# Patient Record
Sex: MALE | Race: WHITE | NOT HISPANIC OR LATINO | Employment: FULL TIME | URBAN - METROPOLITAN AREA
[De-identification: names, ages, dates, MRNs, and addresses within clinical notes are randomized per-mention and may not be internally consistent; named-entity substitution may affect disease eponyms.]

---

## 2023-09-13 PROBLEM — Z98.890: Status: RESOLVED | Noted: 2023-09-13 | Resolved: 2023-09-13

## 2023-09-13 PROBLEM — R05.9 COUGH: Status: RESOLVED | Noted: 2023-09-13 | Resolved: 2023-09-13

## 2023-09-13 PROBLEM — S06.0XAA CONCUSSION: Status: RESOLVED | Noted: 2023-09-13 | Resolved: 2023-09-13

## 2023-09-13 PROBLEM — R53.83 FATIGUE: Status: RESOLVED | Noted: 2023-09-13 | Resolved: 2023-09-13

## 2023-09-13 PROBLEM — R79.89 LOW VITAMIN D LEVEL: Status: ACTIVE | Noted: 2023-09-13

## 2023-09-13 PROBLEM — G44.86 HEADACHE, CERVICOGENIC: Status: RESOLVED | Noted: 2023-09-13 | Resolved: 2023-09-13

## 2023-09-13 PROBLEM — S89.91XA INJURY OF KNEE, RIGHT: Status: RESOLVED | Noted: 2023-09-13 | Resolved: 2023-09-13

## 2023-09-13 PROBLEM — F32.A DEPRESSION: Status: ACTIVE | Noted: 2023-09-13

## 2023-09-13 PROBLEM — J20.9 ACUTE BRONCHITIS: Status: RESOLVED | Noted: 2023-09-13 | Resolved: 2023-09-13

## 2023-09-13 PROBLEM — M25.561 RIGHT KNEE PAIN: Status: RESOLVED | Noted: 2023-09-13 | Resolved: 2023-09-13

## 2023-09-13 PROBLEM — F07.81 POST CONCUSSIVE SYNDROME: Status: RESOLVED | Noted: 2023-09-13 | Resolved: 2023-09-13

## 2023-09-13 PROBLEM — G43.809 VESTIBULAR MIGRAINE: Status: RESOLVED | Noted: 2023-09-13 | Resolved: 2023-09-13

## 2023-09-13 PROBLEM — R07.89 CHEST PRESSURE: Status: RESOLVED | Noted: 2023-09-13 | Resolved: 2023-09-13

## 2023-09-13 PROBLEM — S05.91XA OCULAR TRAUMA OF RIGHT EYE: Status: ACTIVE | Noted: 2023-09-13

## 2023-09-13 PROBLEM — R42 DIZZINESS: Status: RESOLVED | Noted: 2023-09-13 | Resolved: 2023-09-13

## 2023-09-13 PROBLEM — F41.9 ANXIETY: Status: ACTIVE | Noted: 2023-09-13

## 2023-09-13 PROBLEM — S83.241A TEAR OF MEDIAL MENISCUS OF RIGHT KNEE: Status: RESOLVED | Noted: 2023-09-13 | Resolved: 2023-09-13

## 2023-09-13 PROBLEM — R09.81 NASAL CONGESTION: Status: RESOLVED | Noted: 2023-09-13 | Resolved: 2023-09-13

## 2023-09-14 ENCOUNTER — OFFICE VISIT (OUTPATIENT)
Dept: PRIMARY CARE | Facility: CLINIC | Age: 38
End: 2023-09-14
Payer: COMMERCIAL

## 2023-09-14 VITALS — BODY MASS INDEX: 21.98 KG/M2 | WEIGHT: 157 LBS | HEIGHT: 71 IN

## 2023-09-14 DIAGNOSIS — J01.00 ACUTE NON-RECURRENT MAXILLARY SINUSITIS: Primary | ICD-10-CM

## 2023-09-14 PROCEDURE — 99214 OFFICE O/P EST MOD 30 MIN: CPT | Performed by: FAMILY MEDICINE

## 2023-09-14 PROCEDURE — 87635 SARS-COV-2 COVID-19 AMP PRB: CPT

## 2023-09-14 PROCEDURE — 1036F TOBACCO NON-USER: CPT | Performed by: FAMILY MEDICINE

## 2023-09-14 RX ORDER — AMOXICILLIN AND CLAVULANATE POTASSIUM 875; 125 MG/1; MG/1
875 TABLET, FILM COATED ORAL 2 TIMES DAILY
Qty: 14 TABLET | Refills: 0 | Status: SHIPPED | OUTPATIENT
Start: 2023-09-14 | End: 2023-09-21

## 2023-09-14 ASSESSMENT — ENCOUNTER SYMPTOMS
OCCASIONAL FEELINGS OF UNSTEADINESS: 0
LOSS OF SENSATION IN FEET: 0
DEPRESSION: 0

## 2023-09-14 ASSESSMENT — PATIENT HEALTH QUESTIONNAIRE - PHQ9
1. LITTLE INTEREST OR PLEASURE IN DOING THINGS: NOT AT ALL
SUM OF ALL RESPONSES TO PHQ9 QUESTIONS 1 AND 2: 0
SUM OF ALL RESPONSES TO PHQ9 QUESTIONS 1 AND 2: 0
2. FEELING DOWN, DEPRESSED OR HOPELESS: NOT AT ALL
2. FEELING DOWN, DEPRESSED OR HOPELESS: NOT AT ALL
1. LITTLE INTEREST OR PLEASURE IN DOING THINGS: NOT AT ALL

## 2023-09-14 NOTE — PROGRESS NOTES
"Subjective   Patient ID: Tarun Fischer is a 37 y.o. male who presents for Sore Throat (SORE throat  2 days).      HPI  Acute sinusitis c/o headache fever facial pain cough ear pain cough green phlem. No ST .    No current outpatient medications on file prior to visit.     No current facility-administered medications on file prior to visit.      Fever headache   Review of Systems   Constitutional:  Positive for chills and fatigue.   HENT:  Positive for congestion, postnasal drip, rhinorrhea, sinus pressure, sinus pain and sore throat.    Eyes: Negative.    Respiratory:  Positive for cough.    Cardiovascular: Negative.    Gastrointestinal: Negative.    Genitourinary: Negative.        Objective   Height 1.803 m (5' 11\")   Weight 71.2 kg (157 lb)   Body Mass Index 21.90 kg/m²   BSA: 1.89 meters squared  Growth percentiles: Facility age limit for growth %corinna is 20 years. Facility age limit for growth %corinna is 20 years.     Physical Exam  Constitutional:       Appearance: He is ill-appearing.   HENT:      Head: Normocephalic.      Nose: Congestion present.      Mouth/Throat:      Pharynx: Oropharyngeal exudate present.   Cardiovascular:      Rate and Rhythm: Normal rate and regular rhythm.   Abdominal:      General: Abdomen is flat.   Musculoskeletal:      Cervical back: Neck supple.   Neurological:      Mental Status: He is alert.         Assessment/Plan   Problem List Items Addressed This Visit       Acute non-recurrent maxillary sinusitis - Primary    Relevant Medications    amoxicillin-pot clavulanate (Augmentin) 875-125 mg tablet    Other Relevant Orders    Sars-CoV-2 PCR, Symptomatic (Completed)              "

## 2023-09-15 LAB — SARS-COV-2 RESULT: NOT DETECTED

## 2023-09-16 PROBLEM — J01.00 ACUTE NON-RECURRENT MAXILLARY SINUSITIS: Status: ACTIVE | Noted: 2023-09-16

## 2023-09-16 ASSESSMENT — ENCOUNTER SYMPTOMS
SINUS PAIN: 1
CARDIOVASCULAR NEGATIVE: 1
EYES NEGATIVE: 1
COUGH: 1
SINUS PRESSURE: 1
FATIGUE: 1
CHILLS: 1
SORE THROAT: 1
GASTROINTESTINAL NEGATIVE: 1
RHINORRHEA: 1

## 2024-12-31 ENCOUNTER — OFFICE VISIT (OUTPATIENT)
Dept: PRIMARY CARE | Facility: CLINIC | Age: 39
End: 2024-12-31
Payer: COMMERCIAL

## 2024-12-31 VITALS
HEART RATE: 85 BPM | WEIGHT: 159 LBS | BODY MASS INDEX: 22.26 KG/M2 | DIASTOLIC BLOOD PRESSURE: 72 MMHG | SYSTOLIC BLOOD PRESSURE: 101 MMHG | HEIGHT: 71 IN

## 2024-12-31 DIAGNOSIS — J20.9 ACUTE BRONCHITIS, UNSPECIFIED ORGANISM: Primary | ICD-10-CM

## 2024-12-31 DIAGNOSIS — J02.9 SORE THROAT: ICD-10-CM

## 2024-12-31 LAB — POC STREP A RESULT: NEGATIVE

## 2024-12-31 PROCEDURE — 99214 OFFICE O/P EST MOD 30 MIN: CPT | Performed by: FAMILY MEDICINE

## 2024-12-31 PROCEDURE — 87634 RSV DNA/RNA AMP PROBE: CPT

## 2024-12-31 PROCEDURE — 3008F BODY MASS INDEX DOCD: CPT | Performed by: FAMILY MEDICINE

## 2024-12-31 PROCEDURE — 87651 STREP A DNA AMP PROBE: CPT | Performed by: FAMILY MEDICINE

## 2024-12-31 RX ORDER — DOXYCYCLINE 100 MG/1
100 CAPSULE ORAL 2 TIMES DAILY
Qty: 20 CAPSULE | Refills: 0 | Status: SHIPPED | OUTPATIENT
Start: 2024-12-31 | End: 2025-01-10

## 2024-12-31 RX ORDER — BENZONATATE 200 MG/1
200 CAPSULE ORAL 3 TIMES DAILY PRN
Qty: 42 CAPSULE | Refills: 0 | Status: SHIPPED | OUTPATIENT
Start: 2024-12-31 | End: 2025-01-30

## 2024-12-31 RX ORDER — ALBUTEROL SULFATE AND BUDESONIDE 90; 80 UG/1; UG/1
2 AEROSOL, METERED RESPIRATORY (INHALATION) EVERY 6 HOURS PRN
Qty: 10.7 G | Refills: 3 | Status: SHIPPED | OUTPATIENT
Start: 2024-12-31

## 2024-12-31 RX ORDER — PREDNISONE 20 MG/1
20 TABLET ORAL 2 TIMES DAILY
Qty: 14 TABLET | Refills: 0 | Status: SHIPPED | OUTPATIENT
Start: 2024-12-31 | End: 2025-01-07

## 2024-12-31 ASSESSMENT — ENCOUNTER SYMPTOMS
OCCASIONAL FEELINGS OF UNSTEADINESS: 0
DEPRESSION: 0
LOSS OF SENSATION IN FEET: 0

## 2024-12-31 ASSESSMENT — PATIENT HEALTH QUESTIONNAIRE - PHQ9
2. FEELING DOWN, DEPRESSED OR HOPELESS: NOT AT ALL
1. LITTLE INTEREST OR PLEASURE IN DOING THINGS: NOT AT ALL
SUM OF ALL RESPONSES TO PHQ9 QUESTIONS 1 AND 2: 0

## 2025-01-01 PROBLEM — J20.9 ACUTE BRONCHITIS: Status: ACTIVE | Noted: 2025-01-01

## 2025-01-01 PROBLEM — J02.9 SORE THROAT: Status: ACTIVE | Noted: 2025-01-01

## 2025-01-01 LAB — RSV RNA RESP QL NAA+PROBE: NOT DETECTED

## 2025-01-01 ASSESSMENT — ENCOUNTER SYMPTOMS
RHINORRHEA: 1
COUGH: 1
FATIGUE: 1
SINUS PRESSURE: 1
CHILLS: 1
VOMITING: 1
SHORTNESS OF BREATH: 1
EYES NEGATIVE: 1
HEADACHES: 1
SORE THROAT: 1
CARDIOVASCULAR NEGATIVE: 1
SINUS PAIN: 1

## 2025-01-02 NOTE — PROGRESS NOTES
"Subjective   Patient ID: Tin Fischer is a 39 y.o. male who presents for Sore Throat, Cough, Shortness of Breath (From coughing so much), Vomiting (From coughing so much), Headache, and throat dryness.      Sore Throat   This is a new problem. The current episode started yesterday. There has been no fever. Associated symptoms include congestion, coughing, headaches, shortness of breath and vomiting.   Cough  This is a new problem. The current episode started in the past 7 days. The problem occurs every few minutes. Associated symptoms include chills, headaches, postnasal drip, rhinorrhea, a sore throat and shortness of breath.   Shortness of Breath  Associated symptoms include headaches, rhinorrhea, a sore throat and vomiting.   Vomiting   Associated symptoms include chills, coughing and headaches.   Headache   Associated symptoms include coughing, rhinorrhea, sinus pressure, a sore throat and vomiting.     Acute sinusitis c/o headache fever facial pain cough ear pain cough green phlem. No ST .    No current outpatient medications on file prior to visit.     No current facility-administered medications on file prior to visit.      Fever headache   Review of Systems   Constitutional:  Positive for chills and fatigue.   HENT:  Positive for congestion, postnasal drip, rhinorrhea, sinus pressure, sinus pain and sore throat.    Eyes: Negative.    Respiratory:  Positive for cough and shortness of breath.    Cardiovascular: Negative.    Gastrointestinal:  Positive for vomiting.   Genitourinary: Negative.    Neurological:  Positive for headaches.       Objective   /72   Pulse 85   Ht 1.803 m (5' 11\")   Wt 72.1 kg (159 lb)   BMI 22.18 kg/m²   BSA: 1.9 meters squared  Growth percentiles: Facility age limit for growth %corinna is 20 years. Facility age limit for growth %corinna is 20 years.     Physical Exam  Constitutional:       Appearance: He is ill-appearing.   HENT:      Head: Normocephalic.      Nose: Congestion " present.      Mouth/Throat:      Pharynx: Oropharyngeal exudate present.   Cardiovascular:      Rate and Rhythm: Normal rate and regular rhythm.   Abdominal:      General: Abdomen is flat.   Musculoskeletal:      Cervical back: Neck supple.   Neurological:      Mental Status: He is alert.         Assessment/Plan   Problem List Items Addressed This Visit       Acute bronchitis - Primary    Relevant Medications    doxycycline (Vibramycin) 100 mg capsule    predniSONE (Deltasone) 20 mg tablet    albuterol-budesonide (Airsupra) 90-80 mcg/actuation inhaler    benzonatate (Tessalon) 200 mg capsule    Other Relevant Orders    RSV PCR (Completed)    Sore throat    Relevant Orders    POCT NOW STREP A manually resulted (Completed)

## 2025-02-05 ENCOUNTER — OFFICE VISIT (OUTPATIENT)
Dept: PRIMARY CARE | Facility: CLINIC | Age: 40
End: 2025-02-05
Payer: COMMERCIAL

## 2025-02-05 VITALS
WEIGHT: 163.8 LBS | HEIGHT: 71 IN | HEART RATE: 98 BPM | DIASTOLIC BLOOD PRESSURE: 73 MMHG | BODY MASS INDEX: 22.93 KG/M2 | SYSTOLIC BLOOD PRESSURE: 108 MMHG

## 2025-02-05 DIAGNOSIS — J02.9 SORE THROAT: ICD-10-CM

## 2025-02-05 DIAGNOSIS — J20.9 ACUTE BRONCHITIS, UNSPECIFIED ORGANISM: ICD-10-CM

## 2025-02-05 DIAGNOSIS — J01.00 ACUTE NON-RECURRENT MAXILLARY SINUSITIS: ICD-10-CM

## 2025-02-05 DIAGNOSIS — J01.00 ACUTE NON-RECURRENT MAXILLARY SINUSITIS: Primary | ICD-10-CM

## 2025-02-05 LAB — POC STREP A RESULT: NEGATIVE

## 2025-02-05 PROCEDURE — 99214 OFFICE O/P EST MOD 30 MIN: CPT | Performed by: FAMILY MEDICINE

## 2025-02-05 PROCEDURE — 87651 STREP A DNA AMP PROBE: CPT | Performed by: FAMILY MEDICINE

## 2025-02-05 PROCEDURE — 3008F BODY MASS INDEX DOCD: CPT | Performed by: FAMILY MEDICINE

## 2025-02-05 RX ORDER — PREDNISONE 10 MG/1
TABLET ORAL
Qty: 30 TABLET | Refills: 0 | Status: SHIPPED | OUTPATIENT
Start: 2025-02-05

## 2025-02-05 RX ORDER — FLUTICASONE PROPIONATE 50 MCG
2 SPRAY, SUSPENSION (ML) NASAL DAILY
Qty: 16 G | Refills: 3 | Status: SHIPPED | OUTPATIENT
Start: 2025-02-05 | End: 2026-02-05

## 2025-02-05 RX ORDER — MOMETASONE FUROATE AND FORMOTEROL FUMARATE DIHYDRATE 200; 5 UG/1; UG/1
2 AEROSOL RESPIRATORY (INHALATION)
Qty: 13 G | Refills: 0 | Status: SHIPPED | OUTPATIENT
Start: 2025-02-05 | End: 2025-02-05

## 2025-02-05 RX ORDER — FLUTICASONE FUROATE AND VILANTEROL 200; 25 UG/1; UG/1
1 POWDER RESPIRATORY (INHALATION) DAILY
Qty: 60 EACH | Refills: 0 | Status: SHIPPED | OUTPATIENT
Start: 2025-02-05 | End: 2025-03-07

## 2025-02-05 RX ORDER — ALBUTEROL SULFATE 90 UG/1
2 INHALANT RESPIRATORY (INHALATION) EVERY 4 HOURS PRN
Qty: 8 G | Refills: 2 | Status: SHIPPED | OUTPATIENT
Start: 2025-02-05 | End: 2026-02-05

## 2025-02-05 RX ORDER — LEVOFLOXACIN 500 MG/1
500 TABLET, FILM COATED ORAL DAILY
Qty: 10 TABLET | Refills: 0 | Status: SHIPPED | OUTPATIENT
Start: 2025-02-05 | End: 2025-02-15

## 2025-02-05 ASSESSMENT — ENCOUNTER SYMPTOMS
VOMITING: 1
CARDIOVASCULAR NEGATIVE: 1
DEPRESSION: 0
OCCASIONAL FEELINGS OF UNSTEADINESS: 0
FATIGUE: 1
SORE THROAT: 1
RHINORRHEA: 1
EYES NEGATIVE: 1
COUGH: 1
CHILLS: 1
SINUS PAIN: 1
HEADACHES: 1
LOSS OF SENSATION IN FEET: 0
SINUS PRESSURE: 1
SHORTNESS OF BREATH: 1

## 2025-02-05 NOTE — PROGRESS NOTES
Subjective   Patient ID: Tin Fischer is a 39 y.o. male who presents for Cough, Sore Throat, Shortness of Breath, and Sinusitis (Mucus build  up light yellow, brownish.).      Sore Throat   This is a new problem. The current episode started yesterday. There has been no fever. Associated symptoms include congestion, coughing, headaches, shortness of breath and vomiting.   Cough  This is a new problem. The current episode started in the past 7 days. The problem occurs every few minutes. Associated symptoms include chills, headaches, postnasal drip, rhinorrhea, a sore throat and shortness of breath.   Shortness of Breath  Associated symptoms include headaches, rhinorrhea, a sore throat and vomiting.   Vomiting   Associated symptoms include chills, coughing and headaches.   Headache   Associated symptoms include coughing, rhinorrhea, sinus pressure, a sore throat and vomiting.   Sinusitis  Associated symptoms include chills, congestion, coughing, headaches, shortness of breath, sinus pressure and a sore throat.     Acute sinusitis c/o headache fever facial pain cough ear pain cough green phlem. No ST .    Current Outpatient Medications on File Prior to Visit   Medication Sig Dispense Refill   • albuterol-budesonide (Airsupra) 90-80 mcg/actuation inhaler Inhale 2 puffs every 6 hours if needed (sob). 1-2 puffs every 6 hours prn 10.7 g 3   • [] benzonatate (Tessalon) 200 mg capsule Take 1 capsule (200 mg) by mouth 3 times a day as needed for cough. Do not crush or chew. 42 capsule 0     No current facility-administered medications on file prior to visit.      Fever headache   Review of Systems   Constitutional:  Positive for chills and fatigue.   HENT:  Positive for congestion, postnasal drip, rhinorrhea, sinus pressure, sinus pain and sore throat.    Eyes: Negative.    Respiratory:  Positive for cough and shortness of breath.    Cardiovascular: Negative.    Gastrointestinal:  Positive for vomiting.  "  Genitourinary: Negative.    Neurological:  Positive for headaches.       Objective   /73   Pulse 98   Ht 1.803 m (5' 11\")   Wt 74.3 kg (163 lb 12.8 oz)   BMI 22.85 kg/m²   BSA: 1.93 meters squared  Growth percentiles: Facility age limit for growth %corinna is 20 years. Facility age limit for growth %corinna is 20 years.     Physical Exam  Constitutional:       Appearance: He is ill-appearing.   HENT:      Head: Normocephalic.      Nose: Congestion present.      Mouth/Throat:      Pharynx: Oropharyngeal exudate present.   Cardiovascular:      Rate and Rhythm: Normal rate and regular rhythm.   Abdominal:      General: Abdomen is flat.   Musculoskeletal:      Cervical back: Neck supple.   Neurological:      Mental Status: He is alert.       Assessment/Plan   Problem List Items Addressed This Visit       Acute non-recurrent maxillary sinusitis - Primary    Relevant Medications    fluticasone (Flonase Allergy Relief) 50 mcg/actuation nasal spray    predniSONE (Deltasone) 10 mg tablet    levoFLOXacin (Levaquin) 500 mg tablet    albuterol (Ventolin HFA) 90 mcg/actuation inhaler    mometasone-formoterol (Dulera) 200-5 mcg/actuation inhaler    Acute bronchitis    Relevant Medications    fluticasone (Flonase Allergy Relief) 50 mcg/actuation nasal spray    predniSONE (Deltasone) 10 mg tablet    levoFLOXacin (Levaquin) 500 mg tablet    albuterol (Ventolin HFA) 90 mcg/actuation inhaler    mometasone-formoterol (Dulera) 200-5 mcg/actuation inhaler    Sore throat    Relevant Medications    fluticasone (Flonase Allergy Relief) 50 mcg/actuation nasal spray    predniSONE (Deltasone) 10 mg tablet    levoFLOXacin (Levaquin) 500 mg tablet    albuterol (Ventolin HFA) 90 mcg/actuation inhaler    mometasone-formoterol (Dulera) 200-5 mcg/actuation inhaler    Other Relevant Orders    POCT NOW STREP A manually resulted (Completed)              "

## 2025-03-04 DIAGNOSIS — J01.00 ACUTE NON-RECURRENT MAXILLARY SINUSITIS: ICD-10-CM

## 2025-03-04 DIAGNOSIS — J20.9 ACUTE BRONCHITIS, UNSPECIFIED ORGANISM: ICD-10-CM

## 2025-03-04 DIAGNOSIS — J02.9 SORE THROAT: ICD-10-CM

## 2025-03-04 RX ORDER — FLUTICASONE FUROATE AND VILANTEROL TRIFENATATE 200; 25 UG/1; UG/1
1 POWDER RESPIRATORY (INHALATION) DAILY
Qty: 60 EACH | Refills: 3 | Status: SHIPPED | OUTPATIENT
Start: 2025-03-04